# Patient Record
Sex: FEMALE | Race: WHITE | NOT HISPANIC OR LATINO | Employment: STUDENT | URBAN - METROPOLITAN AREA
[De-identification: names, ages, dates, MRNs, and addresses within clinical notes are randomized per-mention and may not be internally consistent; named-entity substitution may affect disease eponyms.]

---
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
## 2025-07-10 NOTE — PROGRESS NOTES
"Subjective:       Patient ID: Luciana Stark is a 20 y.o. female.    Chief Complaint: Establish Care, Anxiety, and Depression    Patient presents to the clinic to reestablish primary care.  Patient has been seen previously in his clinic although it has been several years.  Past medical, family, and social history is reviewed, as well as all chronic conditions, and medications prescribed to treat those conditions.    Patient does have a history of chronic depression with some associated anxiety.  She has been on antidepressants in the past, in fact she took Lexapro in the past, cause her to feel "numb ", she discontinued it.  She estimates that for the past few years, particularly the past year she has had depression, seems to be worsening as of late, has most if not all of the features of major depression causing sleep issues, fatigue, some anhedonia, anxiety, and general unhappiness and sadness.  No suicidal thoughts, she would like to try different antidepressant medication.  She does have some issues with sleeping, sleeps only a few hours per night.  No significant stressors in her life.    She also reports feeling lightheaded when she stands up and somewhat dyspneic at times.  Not sure if it is associated with her depression.        Review of Systems   Constitutional: Negative.  Negative for fatigue and fever.   HENT: Negative.  Negative for sore throat and trouble swallowing.    Eyes: Negative.  Negative for redness and visual disturbance.   Respiratory:  Positive for shortness of breath. Negative for chest tightness.    Cardiovascular: Negative.  Negative for chest pain.   Gastrointestinal: Negative.  Negative for abdominal pain, blood in stool and nausea.   Endocrine: Negative.  Negative for cold intolerance, heat intolerance and polyuria.   Genitourinary: Negative.    Musculoskeletal: Negative.  Negative for arthralgias, gait problem and joint swelling.   Integumentary:  Negative for rash. Negative. "   Neurological:  Positive for light-headedness. Negative for dizziness, weakness and headaches.   Psychiatric/Behavioral:  Positive for decreased concentration, depressed mood, dysphoric mood and sleep disturbance. Negative for agitation, behavioral problems, confusion, hallucinations, self-injury and suicidal ideas. The patient is nervous/anxious.            Patient Care Team:  Humberto Alfred MD as PCP - General (Family Medicine)  Objective:   Visit Vitals  /87   Pulse 84   Resp 18   Ht 5' (1.524 m)   Wt 79.4 kg (175 lb)   LMP 07/04/2025 (Approximate)   SpO2 98%   BMI 34.18 kg/m²        Physical Exam  Constitutional:       Appearance: Normal appearance.   HENT:      Head: Normocephalic.      Mouth/Throat:      Mouth: Mucous membranes are moist.      Pharynx: Oropharynx is clear.   Eyes:      Conjunctiva/sclera: Conjunctivae normal.      Pupils: Pupils are equal, round, and reactive to light.   Cardiovascular:      Rate and Rhythm: Normal rate and regular rhythm.      Heart sounds: Normal heart sounds.   Pulmonary:      Effort: Pulmonary effort is normal.      Breath sounds: Normal breath sounds.   Abdominal:      General: Abdomen is flat.      Palpations: Abdomen is soft.   Musculoskeletal:         General: Normal range of motion.      Cervical back: Neck supple.   Skin:     General: Skin is warm and dry.   Neurological:      General: No focal deficit present.      Mental Status: She is alert and oriented to person, place, and time.   Psychiatric:         Behavior: Behavior normal.         Thought Content: Thought content normal.         Judgment: Judgment normal.      Comments: Depressed mood, flat affect, no delusions, good judgment, no suicidal or homicidal ideations.           Lab Results   Component Value Date     06/14/2022    K 4.2 06/14/2022     06/14/2022    CO2 23 06/14/2022    BUN 8 06/14/2022    CREATININE 0.66 06/14/2022    CALCIUM 9.2 06/14/2022    ANIONGAP 9 06/14/2022     ESTGFRAFRICA  06/14/2022      Comment:      In accordance with NKF-ASN Task Force recommendation, calculation based on the Chronic Kidney Disease Epidemiology Collaboration (CKD-EPI) equation without adjustment for race. eGFR adjusted for gender and age and calculated in ml/min/1.73mSquared. eGFR cannot be calculated if patient is under 18 years of age.     Reference Range:   >= 60 ml/min/1.73mSquared.      Lab Results   Component Value Date    TRIG 315 (A) 12/06/2023    HDL 43 12/06/2023         Assessment:       ICD-10-CM ICD-9-CM   1. Establishing care with new doctor, encounter for  Z76.89 V65.8   2. Moderate episode of recurrent major depressive disorder  F33.1 296.32   3. Lightheadedness  R42 780.4   4. Dyspnea, unspecified type  R06.00 786.09       Current Outpatient Medications   Medication Instructions    desvenlafaxine succinate (PRISTIQ) 50 mg, Oral, Daily     Plan:     Problem List Items Addressed This Visit          Psychiatric    Moderate episode of recurrent major depressive disorder (Chronic)    Overview   Prescribed Pristiq 50 mg daily.    Started on 07/11/2025.  Side effects discussed, including suicidal thoughts.  Contact the clinic for any acute issues, follow-up in one month.         Relevant Medications    desvenlafaxine succinate (PRISTIQ) 50 MG Tb24       Pulmonary    Dyspnea    Overview   Mild dyspnea, discussed on 07/11/2025.  We will continue to monitor this, again the patient has significant depression, started on antidepressant medication, if she continues to report any significant dyspnea or lightheadedness, which is something else that she is experiencing, we may consider sending her for a cardiology evaluation.            Other    Lightheadedness (Chronic)    Overview   Not sure if this could be related to her depression, but it is certainly a possibility.  It was decided that we would try to treat her depression 1st and do further workup if necessary.          Other Visit Diagnoses          Establishing care with new doctor, encounter for    -  Primary    Patient's medical care has been established in this clinic.  All issues addressed, all questions answered,  with appropriate scheduling of follow-up care.                    Follow up in about 4 weeks (around 8/8/2025) for Mood D/O F/up.    This note was created with the assistance of [employer] voice recognition software or phone dictation. There may be transcription errors as a result of using this technology. Effort has been made to assure accuracy of transcription but any obvious errors or omissions should be clarified with the author of the document.

## 2025-07-11 ENCOUNTER — OFFICE VISIT (OUTPATIENT)
Dept: PRIMARY CARE CLINIC | Facility: CLINIC | Age: 20
End: 2025-07-11
Payer: MEDICAID

## 2025-07-11 VITALS
RESPIRATION RATE: 18 BRPM | DIASTOLIC BLOOD PRESSURE: 87 MMHG | WEIGHT: 175 LBS | HEART RATE: 84 BPM | HEIGHT: 60 IN | OXYGEN SATURATION: 98 % | BODY MASS INDEX: 34.36 KG/M2 | SYSTOLIC BLOOD PRESSURE: 124 MMHG

## 2025-07-11 DIAGNOSIS — F33.1 MODERATE EPISODE OF RECURRENT MAJOR DEPRESSIVE DISORDER: Chronic | ICD-10-CM

## 2025-07-11 DIAGNOSIS — R06.00 DYSPNEA, UNSPECIFIED TYPE: ICD-10-CM

## 2025-07-11 DIAGNOSIS — R42 LIGHTHEADEDNESS: Chronic | ICD-10-CM

## 2025-07-11 DIAGNOSIS — Z76.89 ESTABLISHING CARE WITH NEW DOCTOR, ENCOUNTER FOR: Primary | ICD-10-CM

## 2025-07-11 PROBLEM — L05.91 PILONIDAL CYST: Status: RESOLVED | Noted: 2022-06-11 | Resolved: 2025-07-11

## 2025-07-11 RX ORDER — DESVENLAFAXINE 50 MG/1
50 TABLET, FILM COATED, EXTENDED RELEASE ORAL DAILY
Qty: 30 TABLET | Refills: 11 | Status: SHIPPED | OUTPATIENT
Start: 2025-07-11 | End: 2026-07-11

## 2025-08-15 ENCOUNTER — OFFICE VISIT (OUTPATIENT)
Dept: PRIMARY CARE CLINIC | Facility: CLINIC | Age: 20
End: 2025-08-15
Payer: MEDICAID

## 2025-08-15 VITALS
BODY MASS INDEX: 34.36 KG/M2 | RESPIRATION RATE: 18 BRPM | WEIGHT: 175 LBS | DIASTOLIC BLOOD PRESSURE: 84 MMHG | HEIGHT: 60 IN | HEART RATE: 78 BPM | OXYGEN SATURATION: 100 % | SYSTOLIC BLOOD PRESSURE: 120 MMHG

## 2025-08-15 DIAGNOSIS — F33.1 MODERATE EPISODE OF RECURRENT MAJOR DEPRESSIVE DISORDER: Primary | ICD-10-CM
